# Patient Record
(demographics unavailable — no encounter records)

---

## 2024-11-11 NOTE — PHYSICAL EXAM
[Alert] : alert [Oriented x 3] : ~L oriented x 3 [Declined] : declined [FreeTextEntry3] : Focused exam: -mild bitemporal hair thinning

## 2024-11-11 NOTE — ASSESSMENT
[FreeTextEntry1] : #Androgenetic alopecia -chronic, flaring -I have discussed the chronic nature and course of this condition -restart finasteride 1mg daily, sed including changes in mood, sexual SE -start topical minoxidil 7%/niacinamide soln 1-2 times daily to aa, sed  #EIC #Scar - R cheek -avoid picking   RTC 3 mos

## 2024-11-11 NOTE — HISTORY OF PRESENT ILLNESS
[FreeTextEntry1] : hair loss- RPA [de-identified] : 28 year old male patient presents for hair loss.  last seen 8/2023 by Dr. Hoff for AGA - has been on finasteride 1mg qd, ran out in January  with initial improvement but hair loss has worsened on the temples has also used minoxidil topical - not currently using tolerated finasteride well, denies SE denies scalp itch or flaking

## 2024-11-11 NOTE — HISTORY OF PRESENT ILLNESS
[FreeTextEntry1] : hair loss- RPA [de-identified] : 28 year old male patient presents for hair loss.  last seen 8/2023 by Dr. Hoff for AGA - has been on finasteride 1mg qd, ran out in January  with initial improvement but hair loss has worsened on the temples has also used minoxidil topical - not currently using tolerated finasteride well, denies SE denies scalp itch or flaking

## 2024-11-25 NOTE — ASSESSMENT
[FreeTextEntry1] :   1. Stool frequency, usu pc associated w lower abd cramping, urgency ,+inc evac  DDX: r/o infectious causes          IBD          Over flow          Functional   check the following: Labs: cbc, cmet,  crp,  iron/celiac/IBD/food allergy panels, B12, FA, TSH           Stool: Calprotectin, O/P, C/S, C.Diff, fecal fat, elastase  May need  body imaging colonoscopy colorectal surgical consult for anal membranous stricture   blood drawn in the office   Informed Consent: * The risks & Benefits of  Colonoscopy were discussed w patient. * This included but was not limited to perforation, bleeding, sedation /med rxns, missed lesions possibly requiring surgery, blood transfusions, antibiotics & CPR/Intubation. * Pt. understands & agrees to the procedures. The following instructions in regards to the prep and medically essential ( cardiac, pulmonary, sz, psych, endocrine)  pre-op medication administration was reviewed and emphasized with the patient .  * Pt. advised to D/C  ASA/NSAIDs  7  Days   PTP. * [ +++ ]  Dulcolax / Miralax / Mag. Citrate ,  [     ] Prepopik/ Clenpiq ,  [     ] Osmo Prep,  [    ] GoLytely,  prep. reviewed w Pt. * Hold  [           ] AM of procedure. * Hold  [           ] PM  before procedure. * Take  [           ] PM  before procedure. * Take  [           ] AM of procedure.

## 2024-11-25 NOTE — PHYSICAL EXAM
[Normal Sphincter Tone] : normal sphincter tone [No Rectal Mass] : no rectal mass [de-identified] :  small thin membranous stricture at the beginning of the anus

## 2024-11-25 NOTE — HISTORY OF PRESENT ILLNESS
[FreeTextEntry1] :      This HPI is  in a note Titled:  Non - Appointment   and reflects a summary and review of records : including previous and most recent  Labs, body imaging, consults and progress notes, operative and pathology reports, EKG reports, ED records, found in [facility], [facility],  [facility] and any additional records brought in by  the patient at the time of the visit.  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    11/25/24    Today:  Feeling well, no c/o , CP, SOB/ PORTILLO, Cough, Wheeze, Palpitations, edema  11/25/24   Today:  Pt states he was always constipated, until  recently when he started experiencing stool frequency, usually PC, with urgency Saw PCP who did stool tests--> + Hpylori, Rx w ABX x 14 days Pt has seen no change BM: # 4, usually soft, no blood or mucous, + inc evac  Occas Lower colicky pain prior to BM No anorexia, but if he doesnt eat can loose wt  ( 10 lbs) quickly He thinks there is a stress factor No Oral lesions, visual changes, rash  * Nausea--> no * Vomit--> no * Early satiety--> no * Belching--> no * Hiccups--> no * Regurgitation--> no * Acid Taste / Water Brash--> no * Ht burn--> no * Dysphagia--> no * Throat Clearing--> no * Hoarseness--> no * Post-Nasal Drip--> no * Congestion--> no * Globus--> no * Cough--> no * Wheeze / PC-> -no * Bloating--> no * Strain on Defecation--> no * Flatulence--> no * Gurgling--> no * Melena--> no * BPBPR-> -no * Anorexia--> no

## 2024-11-25 NOTE — HISTORY OF PRESENT ILLNESS
[FreeTextEntry1] :      This HPI is  in a note Titled:  Non - Appointment   and reflects a summary and review of records : including previous and most recent  Labs, body imaging, consults and progress notes, operative and pathology reports, EKG reports, ED records, found in [facility], [email or web address],  [facility] and any additional records brought in by  the patient at the time of the visit.  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    11/25/24    Today:  Feeling well, no c/o , CP, SOB/ PORTILLO, Cough, Wheeze, Palpitations, edema  11/25/24   Today:  Pt states he was always constipated, until  recently when he started experiencing stool frequency, usually PC, with urgency Saw PCP who did stool tests--> + Hpylori, Rx w ABX x 14 days Pt has seen no change BM: # 4, usually soft, no blood or mucous, + inc evac  Occas Lower colicky pain prior to BM No anorexia, but if he doesnt eat can loose wt  ( 10 lbs) quickly He thinks there is a stress factor No Oral lesions, visual changes, rash  * Nausea--> no * Vomit--> no * Early satiety--> no * Belching--> no * Hiccups--> no * Regurgitation--> no * Acid Taste / Water Brash--> no * Ht burn--> no * Dysphagia--> no * Throat Clearing--> no * Hoarseness--> no * Post-Nasal Drip--> no * Congestion--> no * Globus--> no * Cough--> no * Wheeze / PC-> -no * Bloating--> no * Strain on Defecation--> no * Flatulence--> no * Gurgling--> no * Melena--> no * BPBPR-> -no * Anorexia--> no

## 2024-11-25 NOTE — PHYSICAL EXAM
[Normal Sphincter Tone] : normal sphincter tone [No Rectal Mass] : no rectal mass [de-identified] :  small thin membranous stricture at the beginning of the anus

## 2024-11-25 NOTE — END OF VISIT
[Time Spent: ___ minutes] : I have spent [unfilled] minutes of time on the encounter which excludes teaching and separately reported services. Screen#: [id number]  Screen Date: 2023  Screen Comment: N/A    Screen#: [id number]  Screen Date: 2023  Screen Comment: N/A

## 2025-02-10 NOTE — HISTORY OF PRESENT ILLNESS
[FreeTextEntry1] : hair loss- RPA [de-identified] : 28 year old male patient presents for hair loss. last seen 11/2024 tolerating finasteride well, denies SE at [facility] started topical minoxidil 7% - feels a burning sensation when applying denies scalp itch or flaking

## 2025-02-10 NOTE — ASSESSMENT
[FreeTextEntry1] : #Androgenetic alopecia -chronic, flaring -I have discussed the chronic nature and course of this condition -continue finasteride 1mg daily, sed including changes in mood, sexual SE -continue topical minoxidil 7%/niacinamide soln 1-2 times daily to aa, sed  #Seb derm - scalp -chronic, flaring -I have discussed the chronic nature and course of this condition -start head and shoulders shampoo -start clobetasol solution 1-2 times per day to affected areas on the scalp as needed  RTC 6 mos

## 2025-03-31 NOTE — HISTORY OF PRESENT ILLNESS
[FreeTextEntry1] :  Language Line 's Name: Ben 's ID #: [id number] Language: Kittitian  Mr. MUSTAPHA WEINER is a 28-year-old male PMH of congenital scoliosis, syndactyly.   Presenting for change in BMs and pre-colonoscopy consult. Evaluated by Dr. Benito in November for change in BMs. In the last two years, BMs changed from formed with straining to liquid in consistency with urgency.  Endorses postprandial stools, 3-4 liquid BMs/day and intermittent anal cramps. Anal cramps occur randomly throughout the day, unrelated to position changes or BMs.  Denies constipation, N/V/D, rectal bleeding, abdominal pain, bloating, unintentional weight loss, early satiety, dysphagia, heartburn or reflux on a regular basis, hematochezia or melena.  Colonoscopy scheduled in February, which was canceled d/t Dr. Benito's YONG.  Fecal elastase, calpro, cdiff, GI PCR, O&P WNL CMP, iron studies, celiac testing, ASCA WNL   Denies FH of IBD, celiac disease, CRC, or other GI-related cancers. Denies tobacco, alcohol, marijuana or other drug use.

## 2025-03-31 NOTE — ASSESSMENT
[FreeTextEntry1] : 1. Presenting for change in BMs with 3-4 liquid BMs/day -Normal abdominal exam - Prior stool testing and diarrhea w/u completed with Dr. Benito WNL. - Discussed using [employer] power 1 heaping tablespoon in 8 oz of water; increase as needed by 1 heaping tablespoon up to 3 times/day to help with stool bulk.   2. Documented congenital anal stenosis and fibrotic anal band by Dr. Benito. - No known anal stenosis or fibrotic anal band as per patient. - Confirmed with Dr. Bass no additional imaging needed prior to colonoscopy.  3. Colonoscopy with Dr. Bass - Indications: change in BMs - Reviewed importance of adequate colon cleansing prior to colonoscopy. Instructed to contact office if he/she has any questions regarding prep. - Explained the risks (including but not limited to cardiopulmonary anesthetic complications, bleeding, perforation, aspiration, missed lesions and misidentified sites of lesions - complications that might necessitate hospitalization or surgery), benefits and alternatives of colonoscopy were reviewed with the patient. Preparation for the procedure was reviewed with the patient. The patient was informed that she/he would be given intravenous anesthesia by an anesthesiologist for the procedure. The patient was informed that a family member or friend must drive the patient following recovery from the procedure. Patient understands and agrees, all questions answered. - Holds: none - Prep: suprep